# Patient Record
Sex: FEMALE | Race: WHITE | NOT HISPANIC OR LATINO | Employment: FULL TIME | URBAN - METROPOLITAN AREA
[De-identification: names, ages, dates, MRNs, and addresses within clinical notes are randomized per-mention and may not be internally consistent; named-entity substitution may affect disease eponyms.]

---

## 2017-04-04 ENCOUNTER — OFFICE VISIT (OUTPATIENT)
Dept: NEUROSURGERY | Facility: CLINIC | Age: 62
End: 2017-04-04

## 2017-04-04 VITALS
BODY MASS INDEX: 25.78 KG/M2 | TEMPERATURE: 97.7 F | WEIGHT: 151 LBS | SYSTOLIC BLOOD PRESSURE: 138 MMHG | HEIGHT: 64 IN | DIASTOLIC BLOOD PRESSURE: 80 MMHG

## 2017-04-04 DIAGNOSIS — G56.22 ULNAR NEUROPATHY AT ELBOW OF LEFT UPPER EXTREMITY: ICD-10-CM

## 2017-04-04 DIAGNOSIS — M43.10 SPONDYLOLISTHESIS, ACQUIRED: ICD-10-CM

## 2017-04-04 DIAGNOSIS — M47.22 CERVICAL SPONDYLOSIS WITH RADICULOPATHY: Primary | ICD-10-CM

## 2017-04-04 DIAGNOSIS — M48.062 LUMBAR STENOSIS WITH NEUROGENIC CLAUDICATION: ICD-10-CM

## 2017-04-04 PROCEDURE — 99214 OFFICE O/P EST MOD 30 MIN: CPT | Performed by: PHYSICIAN ASSISTANT

## 2017-04-04 RX ORDER — CELECOXIB 200 MG/1
200 CAPSULE ORAL 2 TIMES DAILY
Qty: 60 CAPSULE | Refills: 3 | Status: SHIPPED | OUTPATIENT
Start: 2017-04-04 | End: 2017-06-08

## 2017-04-04 NOTE — PROGRESS NOTES
Patient: Yoli Quinn  : 1955  Chart #: [id number]    Date of Service: 2017    CHIEF COMPLAINT:   1. Neck and left arm pain   2. Low back and lower extremity pain with walking and standing intolerance   3. Left shoulder pain     History of Present Illness Ms. Quinn is a 62 year old  who has been seen in our office a few times for the above symptoms. She has not had surgical invention on her spine.  She has known diffuse cervical spondylosis as well as a stable grade I listhesis of L3 on L4 with generous stenosis at L3-4 and L4-5. Electrodiagnostic studies have demonstrated left ulnar neuropathy at the elbow and a C5-6 radiculopathy.  She has seen Dr. Díaz in the past for a couple cortisone injections in her left shoulder which was beneficial.  We last saw her in August of last year and she was doing reasonably well.  For the past 4 months her neck and back pain has increased. She is also having more left shoulder pain and numbness down her arm. She has pain down both legs. It is worse with bending, prolonged sitting, and long walks. No bowel or bladder dysfunction. She was taking celebrex last year which was helpful but when she ran out she never called for a renewal.       The following portions of the patient's history were reviewed and updated as appropriate: allergies, current medications, past family history, past medical history, past social history, past surgical history and problem list.    Review of Systems   Constitutional: Positive for activity change and unexpected weight change. Negative for appetite change, chills, diaphoresis, fatigue and fever.   HENT: Negative for congestion, dental problem, drooling, ear discharge, ear pain, facial swelling, hearing loss, mouth sores, nosebleeds, postnasal drip, rhinorrhea, sinus pressure, sneezing, sore throat, tinnitus, trouble swallowing and voice change.    Eyes: Negative for photophobia, pain, discharge, redness, itching and  "visual disturbance.   Respiratory: Negative for apnea, cough, choking, chest tightness, shortness of breath, wheezing and stridor.    Cardiovascular: Negative for chest pain, palpitations and leg swelling.   Gastrointestinal: Negative for abdominal distention, abdominal pain, anal bleeding, blood in stool, constipation, diarrhea, nausea, rectal pain and vomiting.   Endocrine: Negative for cold intolerance, heat intolerance, polydipsia, polyphagia and polyuria.   Genitourinary: Positive for frequency and urgency. Negative for decreased urine volume, difficulty urinating, dysuria, enuresis, flank pain, genital sores and hematuria.   Musculoskeletal: Positive for back pain, neck pain and neck stiffness. Negative for arthralgias, gait problem, joint swelling and myalgias.   Skin: Negative for color change, pallor, rash and wound.   Allergic/Immunologic: Negative for environmental allergies, food allergies and immunocompromised state.   Neurological: Positive for numbness and headaches. Negative for dizziness, tremors, seizures, syncope, facial asymmetry, speech difficulty, weakness and light-headedness.   Hematological: Negative for adenopathy. Does not bruise/bleed easily.   Psychiatric/Behavioral: Positive for sleep disturbance. Negative for agitation, behavioral problems, confusion, decreased concentration, dysphoric mood, hallucinations, self-injury and suicidal ideas. The patient is not nervous/anxious and is not hyperactive.        Objective   Vital Signs: Blood pressure 138/80, temperature 97.7 °F (36.5 °C), height 64\" (162.6 cm), weight 151 lb (68.5 kg).  Physical Exam   Constitutional: She appears well-developed and well-nourished. No distress.   HENT:   Head: Normocephalic and atraumatic.   Eyes: EOM are normal. Pupils are equal, round, and reactive to light.   Cardiovascular: Normal rate, regular rhythm and normal heart sounds.    Pulmonary/Chest: Effort normal and breath sounds normal.   Psychiatric: She has " a normal mood and affect. Her behavior is normal. Thought content normal.   Nursing note and vitals reviewed.  Musculoskeletal:  Strength is intact in upper and lower extremities to direct testing.  Muscle is normal throughout.  Station and gait are normal.  Straight leg raising is negative.  Neurologic:  Gait: Able to tandem walk without difficulty.  Coordination is intact.  Finger to nose, heel-to-shin, rapid alternating movements.  Deep tendon reflexes: 2+ and symmetrical.  Sensation is intact to light touch throughout.  Patient is oriented to person, place, and time.  Ritter sign negative    Assessment/Plan    Diagnosis:   1. Cervical spondylosis with left upper extremity radiculopathy  2. L3-4 spondylolisthesis and stenosis.  3. Left ulnar neuropathy at the elbow  4. Left shoulder pain     Medical Decision Making: Ms. Quinn has multiple complaints today.  I would like to start with a few simple things and see if we can narrow things down a bit. I have referred her for some formal physical therapy and I restarted her celebrex. I would also like her to touch base with Dr. Díaz again to address some likely primary shoulder dysfunction. She is very amenable to this. She will follow up in 6 weeks to check her progress, sooner if clinically indicated.               Valeria Voss PA-C  Patient Care Team:  Niranjan Griffin MD as PCP - General  Niranjan Griffin MD as PCP - Family Medicine  Niranjan Griffin MD as Referring Physician (Internal Medicine)

## 2017-06-05 ENCOUNTER — HOSPITAL ENCOUNTER (EMERGENCY)
Facility: HOSPITAL | Age: 62
Discharge: HOME | End: 2017-06-05
Attending: EMERGENCY MEDICINE | Admitting: EMERGENCY MEDICINE

## 2017-06-05 ENCOUNTER — APPOINTMENT (OUTPATIENT)
Dept: GENERAL RADIOLOGY | Facility: HOSPITAL | Age: 62
End: 2017-06-05

## 2017-06-05 VITALS
RESPIRATION RATE: 16 BRPM | OXYGEN SATURATION: 100 % | BODY MASS INDEX: 25.78 KG/M2 | SYSTOLIC BLOOD PRESSURE: 145 MMHG | HEART RATE: 74 BPM | HEIGHT: 64 IN | DIASTOLIC BLOOD PRESSURE: 81 MMHG | TEMPERATURE: 97.5 F | WEIGHT: 151 LBS

## 2017-06-05 DIAGNOSIS — R20.2 PARESTHESIAS IN LEFT HAND: ICD-10-CM

## 2017-06-05 DIAGNOSIS — D64.9 BORDERLINE ANEMIA: ICD-10-CM

## 2017-06-05 DIAGNOSIS — R07.89 CHEST DISCOMFORT: Primary | ICD-10-CM

## 2017-06-05 LAB
ALBUMIN SERPL-MCNC: 4.4 G/DL (ref 3.2–4.8)
ALBUMIN/GLOB SERPL: 1.6 G/DL (ref 1.5–2.5)
ALP SERPL-CCNC: 85 U/L (ref 25–100)
ALT SERPL W P-5'-P-CCNC: 13 U/L (ref 7–40)
ANION GAP SERPL CALCULATED.3IONS-SCNC: 4 MMOL/L (ref 3–11)
AST SERPL-CCNC: 18 U/L (ref 0–33)
BASOPHILS # BLD AUTO: 0.03 10*3/MM3 (ref 0–0.2)
BASOPHILS NFR BLD AUTO: 0.5 % (ref 0–1)
BILIRUB SERPL-MCNC: 0.3 MG/DL (ref 0.3–1.2)
BNP SERPL-MCNC: 11 PG/ML (ref 0–100)
BUN BLD-MCNC: 9 MG/DL (ref 9–23)
BUN/CREAT SERPL: 12.9 (ref 7–25)
CALCIUM SPEC-SCNC: 10 MG/DL (ref 8.7–10.4)
CHLORIDE SERPL-SCNC: 106 MMOL/L (ref 99–109)
CO2 SERPL-SCNC: 27 MMOL/L (ref 20–31)
CREAT BLD-MCNC: 0.7 MG/DL (ref 0.6–1.3)
DEPRECATED RDW RBC AUTO: 46.3 FL (ref 37–54)
EOSINOPHIL # BLD AUTO: 0.15 10*3/MM3 (ref 0.1–0.3)
EOSINOPHIL NFR BLD AUTO: 2.7 % (ref 0–3)
ERYTHROCYTE [DISTWIDTH] IN BLOOD BY AUTOMATED COUNT: 14 % (ref 11.3–14.5)
GFR SERPL CREATININE-BSD FRML MDRD: 85 ML/MIN/1.73
GLOBULIN UR ELPH-MCNC: 2.8 GM/DL
GLUCOSE BLD-MCNC: 87 MG/DL (ref 70–100)
HCT VFR BLD AUTO: 34.4 % (ref 34.5–44)
HGB BLD-MCNC: 10.8 G/DL (ref 11.5–15.5)
HOLD SPECIMEN: NORMAL
HOLD SPECIMEN: NORMAL
IMM GRANULOCYTES # BLD: 0.02 10*3/MM3 (ref 0–0.03)
IMM GRANULOCYTES NFR BLD: 0.4 % (ref 0–0.6)
LIPASE SERPL-CCNC: 41 U/L (ref 6–51)
LYMPHOCYTES # BLD AUTO: 2.07 10*3/MM3 (ref 0.6–4.8)
LYMPHOCYTES NFR BLD AUTO: 37.2 % (ref 24–44)
MCH RBC QN AUTO: 28.3 PG (ref 27–31)
MCHC RBC AUTO-ENTMCNC: 31.4 G/DL (ref 32–36)
MCV RBC AUTO: 90.1 FL (ref 80–99)
MONOCYTES # BLD AUTO: 0.31 10*3/MM3 (ref 0–1)
MONOCYTES NFR BLD AUTO: 5.6 % (ref 0–12)
NEUTROPHILS # BLD AUTO: 2.99 10*3/MM3 (ref 1.5–8.3)
NEUTROPHILS NFR BLD AUTO: 53.6 % (ref 41–71)
PLATELET # BLD AUTO: 309 10*3/MM3 (ref 150–450)
PMV BLD AUTO: 9.7 FL (ref 6–12)
POTASSIUM BLD-SCNC: 3.8 MMOL/L (ref 3.5–5.5)
PROT SERPL-MCNC: 7.2 G/DL (ref 5.7–8.2)
RBC # BLD AUTO: 3.82 10*6/MM3 (ref 3.89–5.14)
SODIUM BLD-SCNC: 137 MMOL/L (ref 132–146)
TROPONIN I SERPL-MCNC: 0 NG/ML (ref 0–0.07)
TROPONIN I SERPL-MCNC: 0 NG/ML (ref 0–0.07)
WBC NRBC COR # BLD: 5.57 10*3/MM3 (ref 3.5–10.8)
WHOLE BLOOD HOLD SPECIMEN: NORMAL
WHOLE BLOOD HOLD SPECIMEN: NORMAL

## 2017-06-05 PROCEDURE — 99284 EMERGENCY DEPT VISIT MOD MDM: CPT

## 2017-06-05 PROCEDURE — 93005 ELECTROCARDIOGRAM TRACING: CPT

## 2017-06-05 PROCEDURE — 93005 ELECTROCARDIOGRAM TRACING: CPT | Performed by: EMERGENCY MEDICINE

## 2017-06-05 PROCEDURE — 83690 ASSAY OF LIPASE: CPT | Performed by: EMERGENCY MEDICINE

## 2017-06-05 PROCEDURE — 80053 COMPREHEN METABOLIC PANEL: CPT | Performed by: EMERGENCY MEDICINE

## 2017-06-05 PROCEDURE — 85025 COMPLETE CBC W/AUTO DIFF WBC: CPT | Performed by: EMERGENCY MEDICINE

## 2017-06-05 PROCEDURE — 84484 ASSAY OF TROPONIN QUANT: CPT

## 2017-06-05 PROCEDURE — 71010 HC CHEST PA OR AP: CPT

## 2017-06-05 PROCEDURE — 83880 ASSAY OF NATRIURETIC PEPTIDE: CPT | Performed by: EMERGENCY MEDICINE

## 2017-06-05 RX ORDER — ASPIRIN 81 MG/1
324 TABLET, CHEWABLE ORAL ONCE
Status: DISCONTINUED | OUTPATIENT
Start: 2017-06-05 | End: 2017-06-05 | Stop reason: HOSPADM

## 2017-06-05 RX ORDER — SODIUM CHLORIDE 0.9 % (FLUSH) 0.9 %
10 SYRINGE (ML) INJECTION AS NEEDED
Status: DISCONTINUED | OUTPATIENT
Start: 2017-06-05 | End: 2017-06-05 | Stop reason: HOSPADM

## 2017-06-05 RX ORDER — ASPIRIN 81 MG/1
243 TABLET, CHEWABLE ORAL ONCE
Status: COMPLETED | OUTPATIENT
Start: 2017-06-05 | End: 2017-06-05

## 2017-06-05 RX ADMIN — ASPIRIN 81 MG 243 MG: 81 TABLET ORAL at 12:16

## 2017-06-05 NOTE — DISCHARGE INSTRUCTIONS
No vigorous physical activity until follow-up with the chest pain center and your cleared      Follow up with one of the [facility] physician groups below to setup primary care. If you have trouble following up, please call Johnna Allan, our transitional care nurse, at [phone].    (Dr. Dacosta, Dr. Bolanos, Dr. Santos, and Dr. Canseco.)  [facility], Primary Care, [phone], [address], [place] [address]    [facility], Primary Care, [phone], [address] [place], [address] [facility], Primary Care, [phone], [address] [place], [address] [facility], Primary Care, [phone], [address] [place], [address]     [place] [address] [facility], Primary Care, [phone], [address] [place], [address]    [place] [address] [facility], Primary Care, [phone], [address]    [place], [address] [facility], Primary Care, [phone], [address] [place], [address] [facility], Primary Care, [phone], [address] [place], [address] [facility], Primary Care, [phone], [address] [place], [address] [facility], Primary Care, [phone], [address] [place], [address] [facility], Primary Care, [phone], [address], [place], [address] [place] [address], Primary Care, [phone], [address] [place], [facility]    Hypertension  Hypertension, commonly called high  blood pressure, is when the force of blood pumping through your arteries is too strong. Your arteries are the blood vessels that carry blood from your heart throughout your body. A blood pressure reading consists of a higher number over a lower number, such as 110/72. The higher number (systolic) is the pressure inside your arteries when your heart pumps. The lower number (diastolic) is the pressure inside your arteries when your heart relaxes. Ideally you want your blood pressure below 120/80.  Hypertension forces your heart to work harder to pump blood. Your arteries may become narrow or stiff. Having untreated or uncontrolled hypertension can cause heart attack, stroke, kidney disease, and other problems.  RISK FACTORS  Some risk factors for high blood pressure are controllable. Others are not.   Risk factors you cannot control include:   · Race. You may be at higher risk if you are .  · Age. Risk increases with age.  · Gender. Men are at higher risk than women before age 45 years. After age 65, women are at higher risk than men.  Risk factors you can control include:  · Not getting enough exercise or physical activity.  · Being overweight.  · Getting too much fat, sugar, calories, or salt in your diet.  · Drinking too much alcohol.  SIGNS AND SYMPTOMS  Hypertension does not usually cause signs or symptoms. Extremely high blood pressure (hypertensive crisis) may cause headache, anxiety, shortness of breath, and nosebleed.  DIAGNOSIS  To check if you have hypertension, your health care provider will measure your blood pressure while you are seated, with your arm held at the level of your heart. It should be measured at least twice using the same arm. Certain conditions can cause a difference in blood pressure between your right and left arms. A blood pressure reading that is higher than normal on one occasion does not mean that you need treatment. If it is not clear whether you have high blood  pressure, you may be asked to return on a different day to have your blood pressure checked again. Or, you may be asked to monitor your blood pressure at home for 1 or more weeks.  TREATMENT  Treating high blood pressure includes making lifestyle changes and possibly taking medicine. Living a healthy lifestyle can help lower high blood pressure. You may need to change some of your habits.  Lifestyle changes may include:  · Following the DASH diet. This diet is high in fruits, vegetables, and whole grains. It is low in salt, red meat, and added sugars.  · Keep your sodium intake below 2,300 mg per day.  · Getting at least 30-45 minutes of aerobic exercise at least 4 times per week.  · Losing weight if necessary.  · Not smoking.  · Limiting alcoholic beverages.  · Learning ways to reduce stress.  Your health care provider may prescribe medicine if lifestyle changes are not enough to get your blood pressure under control, and if one of the following is true:  · You are 18-59 years of age and your systolic blood pressure is above 140.  · You are 60 years of age or older, and your systolic blood pressure is above 150.  · Your diastolic blood pressure is above 90.  · You have diabetes, and your systolic blood pressure is over 140 or your diastolic blood pressure is over 90.  · You have kidney disease and your blood pressure is above 140/90.  · You have heart disease and your blood pressure is above 140/90.  Your personal target blood pressure may vary depending on your medical conditions, your age, and other factors.  HOME CARE INSTRUCTIONS  · Have your blood pressure rechecked as directed by your health care provider.    · Take medicines only as directed by your health care provider. Follow the directions carefully. Blood pressure medicines must be taken as prescribed. The medicine does not work as well when you skip doses. Skipping doses also puts you at risk for problems.  · Do not smoke.    · Monitor your blood  pressure at home as directed by your health care provider.   SEEK MEDICAL CARE IF:   · You think you are having a reaction to medicines taken.  · You have recurrent headaches or feel dizzy.  · You have swelling in your ankles.  · You have trouble with your vision.  SEEK IMMEDIATE MEDICAL CARE IF:  · You develop a severe headache or confusion.  · You have unusual weakness, numbness, or feel faint.  · You have severe chest or abdominal pain.  · You vomit repeatedly.  · You have trouble breathing.  MAKE SURE YOU:   · Understand these instructions.  · Will watch your condition.  · Will get help right away if you are not doing well or get worse.     This information is not intended to replace advice given to you by your health care provider. Make sure you discuss any questions you have with your health care provider.     Document Released: 12/18/2006 Document Revised: 05/03/2016 Document Reviewed: 10/10/2014  [employer] Interactive Patient Education ©2017 [employer]

## 2017-06-05 NOTE — ED PROVIDER NOTES
Subjective   HPI Comments: Mrs. Yoli Quinn is a 62 y.o. female who presents to the ED with c/o chest pressure. She notes of intermittent chest pressure for a couple weeks now. Today her pressure has worsened and has developed some associated SOA, and left arm numbness. She also is dealing cervical spondylosis for the better part of a year now. She reports her symptoms from that is different than her symptoms today. She denies any urianry or stool sx, or any other acute sx at this time. She did have a hx of DM, but that has resolved with weight loss.    Patient is a 62 y.o. female presenting with chest pain.   History provided by:  Patient  Chest Pain   Pain location:  Unable to specify  Pain quality: pressure    Pain radiates to:  Does not radiate  Pain severity:  Mild  Onset quality:  Sudden  Duration: A couple weeks.  Timing:  Intermittent  Progression:  Worsening  Chronicity:  New  Relieved by:  None tried  Worsened by:  Nothing  Ineffective treatments:  None tried  Associated symptoms: numbness (Left arm) and shortness of breath    Associated symptoms: no abdominal pain, no cough, no fever, no nausea and no vomiting    Risk factors: no diabetes mellitus and no hypertension        Review of Systems   Constitutional: Negative for chills and fever.   HENT: Negative for congestion, rhinorrhea and sore throat.    Respiratory: Positive for shortness of breath. Negative for cough.    Cardiovascular: Positive for chest pain.   Gastrointestinal: Negative for abdominal pain, blood in stool, diarrhea, nausea and vomiting.   Genitourinary: Negative for difficulty urinating, dysuria and hematuria.   Neurological: Positive for numbness (Left arm).   All other systems reviewed and are negative.      Past Medical History:   Diagnosis Date   • Diabetes mellitus    • Hypertension    • Low back pain    1:40 PM  Sent from [facility] for chest pain. Neck and left arm pain is somewhat chronic. HPI from Dr. Voss's note on  "4/4/2017:  \"History of Present Illness Ms. Quinn is a 62 year old  who has been seen in our office a few times for the above symptoms. She has not had surgical invention on her spine. She has known diffuse cervical spondylosis as well as a stable grade I listhesis of L3 on L4 with generous stenosis at L3-4 and L4-5. Electrodiagnostic studies have demonstrated left ulnar neuropathy at the elbow and a C5-6 radiculopathy. She has seen Dr. Díaz in the past for a couple cortisone injections in her left shoulder which was beneficial. We last saw her in August of last year and she was doing reasonably well. For the past 4 months her neck and back pain has increased. She is also having more left shoulder pain and numbness down her arm. She has pain down both legs. It is worse with bending, prolonged sitting, and long walks. No bowel or bladder dysfunction. She was taking celebrex last year which was helpful but when she ran out she never called for a renewal.\" -EIR    Allergies   Allergen Reactions   • Codeine Sulfate    • Lidocaine        Past Surgical History:   Procedure Laterality Date   • TONSILLECTOMY     • TUBAL ABDOMINAL LIGATION         Family History   Problem Relation Age of Onset   • Cancer Mother    • Heart disease Father        Social History     Social History   • Marital status:      Spouse name: N/A   • Number of children: N/A   • Years of education: N/A     Social History Main Topics   • Smoking status: Never Smoker   • Smokeless tobacco: Never Used   • Alcohol use No   • Drug use: No   • Sexual activity: Defer     Other Topics Concern   • None     Social History Narrative   • None         Objective   Physical Exam   Constitutional: She is oriented to person, place, and time. She appears well-developed and well-nourished. No distress.   Problems with he right thumb and hand but is chronic.   HENT:   Head: Normocephalic and atraumatic.   Mouth/Throat: Oropharynx is clear and moist. "   Eyes: Conjunctivae are normal.   Neck: Normal range of motion. Neck supple.   Cardiovascular: Normal rate, regular rhythm, normal heart sounds and intact distal pulses.    Pulmonary/Chest: Effort normal and breath sounds normal. No respiratory distress.   Abdominal: Soft. There is no tenderness.   Musculoskeletal: Normal range of motion.   Neurological: She is alert and oriented to person, place, and time. She has normal strength.   Skin: Skin is warm and dry.   Psychiatric: She has a normal mood and affect. Her behavior is normal.   Nursing note and vitals reviewed.      Procedures         ED Course  ED Course     Recent Results (from the past 24 hour(s))   Comprehensive Metabolic Panel    Collection Time: 06/05/17 11:48 AM   Result Value Ref [place]    Glucose 87 70 - 100 mg/dL    BUN 9 9 - 23 mg/dL    Creatinine 0.70 0.60 - 1.30 mg/dL    Sodium 137 132 - 146 mmol/L    Potassium 3.8 3.5 - 5.5 mmol/L    Chloride 106 99 - 109 mmol/L    CO2 27.0 20.0 - 31.0 mmol/L    Calcium 10.0 8.7 - 10.4 mg/dL    Total Protein 7.2 5.7 - 8.2 g/dL    Albumin 4.40 3.20 - 4.80 g/dL    ALT (SGPT) 13 7 - 40 U/L    AST (SGOT) 18 0 - 33 U/L    Alkaline Phosphatase 85 25 - 100 U/L    Total Bilirubin 0.3 0.3 - 1.2 mg/dL    eGFR Non African Amer 85 >60 mL/min/1.73    Globulin 2.8 gm/dL    A/G Ratio 1.6 1.5 - 2.5 g/dL    BUN/Creatinine Ratio 12.9 7.0 - 25.0    Anion Gap 4.0 3.0 - 11.0 mmol/L   Lipase    Collection Time: 06/05/17 11:48 AM   Result Value Ref [place]    Lipase 41 6 - 51 U/L   BNP    Collection Time: 06/05/17 11:48 AM   Result Value Ref [place]    BNP 11.0 0.0 - 100.0 pg/mL   Light Blue Top    Collection Time: 06/05/17 11:48 AM   Result Value Ref [place]    Extra Tube hold for add-on    Green Top (Gel)    Collection Time: 06/05/17 11:48 AM   Result Value Ref [place]    Extra Tube Hold for add-ons.    Lavender Top    Collection Time: 06/05/17 11:48 AM   Result Value Ref [place]    Extra Tube hold for add-on    Gold Top - SST    Collection  Time: 06/05/17 11:48 AM   Result Value Ref [place]    Extra Tube Hold for add-ons.    CBC Auto Differential    Collection Time: 06/05/17 11:48 AM   Result Value Ref [place]    WBC 5.57 3.50 - 10.80 10*3/mm3    RBC 3.82 (L) 3.89 - 5.14 10*6/mm3    Hemoglobin 10.8 (L) 11.5 - 15.5 g/dL    Hematocrit 34.4 (L) 34.5 - 44.0 %    MCV 90.1 80.0 - 99.0 fL    MCH 28.3 27.0 - 31.0 pg    MCHC 31.4 (L) 32.0 - 36.0 g/dL    RDW 14.0 11.3 - 14.5 %    RDW-SD 46.3 37.0 - 54.0 fl    MPV 9.7 6.0 - 12.0 fL    Platelets 309 150 - 450 10*3/mm3    Neutrophil % 53.6 41.0 - 71.0 %    Lymphocyte % 37.2 24.0 - 44.0 %    Monocyte % 5.6 0.0 - 12.0 %    Eosinophil % 2.7 0.0 - 3.0 %    Basophil % 0.5 0.0 - 1.0 %    Immature Grans % 0.4 0.0 - 0.6 %    Neutrophils, Absolute 2.99 1.50 - 8.30 10*3/mm3    Lymphocytes, Absolute 2.07 0.60 - 4.80 10*3/mm3    Monocytes, Absolute 0.31 0.00 - 1.00 10*3/mm3    Eosinophils, Absolute 0.15 0.10 - 0.30 10*3/mm3    Basophils, Absolute 0.03 0.00 - 0.20 10*3/mm3    Immature Grans, Absolute 0.02 0.00 - 0.03 10*3/mm3   POC Troponin, Rapid    Collection Time: 06/05/17 11:50 AM   Result Value Ref [place]    Troponin I 0.00 0.00 - 0.07 ng/mL   POC Troponin, Rapid    Collection Time: 06/05/17  2:02 PM   Result Value Ref [place]    Troponin I 0.00 0.00 - 0.07 ng/mL     Note: In addition to lab results from this visit, the labs listed above may include labs taken at another facility or during a different encounter within the last 24 hours. Please correlate lab times with ED admission and discharge times for further clarification of the services performed during this visit.    XR Chest 1 View   Preliminary Result   No acute cardiopulmonary disease.       D:  06/05/2017   E:  06/05/2017            Vitals:    06/05/17 1415 06/05/17 1430 06/05/17 1431 06/05/17 1445   BP: 140/84 137/83  145/81   BP Location:       Patient Position:       Pulse:       Resp:       Temp:       TempSrc:       SpO2:   99% 100%   Weight:       Height:          Medications   aspirin chewable tablet 243 mg (243 mg Oral Given 6/5/17 1216)     ECG/EMG Results (last 24 hours)     Procedure Component Value Units Date/Time    ECG 12 Lead [[id number]] Collected:  06/05/17 1142     Updated:  06/05/17 1343    Narrative:       Test Reason : chest pain  Blood Pressure : **/** mmHG  Vent. Rate : 073 BPM     Atrial Rate : 073 BPM     P-R Int : 148 ms          QRS Dur : 080 ms      QT Int : 390 ms       P-R-T Axes : 074 060 082 degrees     QTc Int : 429 ms    Normal sinus rhythm  When compared with ECG of 16-JUL-2013 10:02,  Electronic demand pacing is no longer present  premature ventricular complexes are no longer present  i cant see that the patient ever had a pacemaker  Confirmed by PAOLA AYALA MD ([id number]) on 6/5/2017 1:43:05 PM    Referred By:  EDMD           Confirmed By:PAOLA AYALA MD                HEART Score  History: Slightly suspicious (+0)  ECG: Normal (+0)  Age: 45 through 65 (+1)  Risk Factors: 1 - 2 risk factors (+1)  Troponin: Normal limit or lower (+0)  Total: 2             MDM  Number of Diagnoses or Management Options  Borderline anemia:   Chest discomfort:   Paresthesias in left hand:   Diagnosis management comments:       I reviewed all available studies at the bedside with the patient.  They are reassuring.  Her cardiac markers and EKGs are unrevealing.  She has had chest discomfort and dyspnea for weeks.    I think she may have 2 separate issues. Some the paresthesias in her left hand and arm have been chronic and she has seen Dr. Duncan for these. I'll refer her back to him for further evaluation.    I will refer her to the chest pain clinic for follow-up of the chest discomfort as she may need further risk stratification for coronary artery disease.    She does have borderline anemia and she will follow-up with her primary care doctor in [place]. She actually requested a list of primary care doctors here in [place] and I'll give her this to follow-up  with regarding this visit.    No vigorous physical activity until cleared by chest pain clinic.    All are agreeable with the plan       Amount and/or Complexity of Data Reviewed  Clinical lab tests: reviewed  Tests in the radiology section of CPT®: reviewed  Tests in the medicine section of CPT®: reviewed        Final diagnoses:   Chest discomfort   Paresthesias in left hand   Borderline anemia   EMR Dragon/Transcription disclaimer:  Much of this encounter note is an electronic transcription/translation of spoken language to printed text. The electronic translation of spoken language may permit erroneous, or at times, nonsensical words or phrases to be inadvertently transcribed; Although I have reviewed the note for such errors, some may still exist.      Documentation assistance provided by leonardo FIELDS.  Information recorded by the leonardo was done at my direction and has been verified and validated by me.     Yvan Fields  06/05/17 1355       Yvan Fields  06/05/17 1414       Yvan Fields  06/05/17 1511       Ameya Olmos MD  06/06/17 0973

## 2017-06-08 ENCOUNTER — OFFICE VISIT (OUTPATIENT)
Dept: CARDIOLOGY | Facility: HOSPITAL | Age: 62
End: 2017-06-08

## 2017-06-08 ENCOUNTER — HOSPITAL ENCOUNTER (OUTPATIENT)
Dept: CARDIOLOGY | Facility: HOSPITAL | Age: 62
Discharge: HOME | End: 2017-06-08
Admitting: NURSE PRACTITIONER

## 2017-06-08 VITALS
DIASTOLIC BLOOD PRESSURE: 56 MMHG | TEMPERATURE: 97 F | HEART RATE: 74 BPM | RESPIRATION RATE: 22 BRPM | HEIGHT: 64 IN | OXYGEN SATURATION: 100 % | WEIGHT: 152 LBS | SYSTOLIC BLOOD PRESSURE: 105 MMHG | BODY MASS INDEX: 25.95 KG/M2

## 2017-06-08 VITALS
DIASTOLIC BLOOD PRESSURE: 80 MMHG | BODY MASS INDEX: 25.95 KG/M2 | HEART RATE: 66 BPM | WEIGHT: 152 LBS | HEIGHT: 64 IN | SYSTOLIC BLOOD PRESSURE: 138 MMHG

## 2017-06-08 DIAGNOSIS — R07.89 ATYPICAL CHEST PAIN: ICD-10-CM

## 2017-06-08 DIAGNOSIS — R07.89 ATYPICAL CHEST PAIN: Primary | ICD-10-CM

## 2017-06-08 DIAGNOSIS — R20.0 NUMBNESS AND TINGLING IN LEFT ARM: ICD-10-CM

## 2017-06-08 DIAGNOSIS — R20.2 NUMBNESS AND TINGLING IN LEFT ARM: ICD-10-CM

## 2017-06-08 DIAGNOSIS — R53.83 OTHER FATIGUE: ICD-10-CM

## 2017-06-08 LAB
BH CV STRESS BP STAGE 1: NORMAL
BH CV STRESS DURATION MIN STAGE 1: 3
BH CV STRESS DURATION MIN STAGE 2: 3
BH CV STRESS DURATION MIN STAGE 3: 3
BH CV STRESS DURATION MIN STAGE 4: 0
BH CV STRESS DURATION SEC STAGE 1: 0
BH CV STRESS DURATION SEC STAGE 2: 0
BH CV STRESS DURATION SEC STAGE 3: 0
BH CV STRESS DURATION SEC STAGE 4: 10
BH CV STRESS GRADE STAGE 1: 10
BH CV STRESS GRADE STAGE 2: 12
BH CV STRESS GRADE STAGE 3: 14
BH CV STRESS GRADE STAGE 4: 16
BH CV STRESS HR STAGE 1: 111
BH CV STRESS HR STAGE 2: 131
BH CV STRESS HR STAGE 3: 137
BH CV STRESS HR STAGE 4: 139
BH CV STRESS METS STAGE 1: 5
BH CV STRESS METS STAGE 2: 7.5
BH CV STRESS METS STAGE 3: 10
BH CV STRESS METS STAGE 4: 13.5
BH CV STRESS O2 STAGE 1: 100
BH CV STRESS O2 STAGE 2: 100
BH CV STRESS O2 STAGE 4: 98
BH CV STRESS PROTOCOL 1: NORMAL
BH CV STRESS RECOVERY BP: NORMAL MMHG
BH CV STRESS RECOVERY HR: 86 BPM
BH CV STRESS RECOVERY O2: 96 %
BH CV STRESS SPEED STAGE 1: 1.7
BH CV STRESS SPEED STAGE 2: 2.5
BH CV STRESS SPEED STAGE 3: 3.4
BH CV STRESS SPEED STAGE 4: 4.2
BH CV STRESS STAGE 1: 1
BH CV STRESS STAGE 2: 2
BH CV STRESS STAGE 3: 3
BH CV STRESS STAGE 4: 4
MAXIMAL PREDICTED HEART RATE: 160 BPM
PERCENT MAX PREDICTED HR: 88.13 %
STRESS BASELINE BP: NORMAL MMHG
STRESS BASELINE HR: 65 BPM
STRESS O2 SAT REST: 100 %
STRESS PERCENT HR: 104 %
STRESS POST ESTIMATED WORKLOAD: 10.3 METS
STRESS POST EXERCISE DUR MIN: 9 MIN
STRESS POST EXERCISE DUR SEC: 10 SEC
STRESS POST O2 SAT PEAK: 98 %
STRESS POST PEAK BP: NORMAL MMHG
STRESS POST PEAK HR: 141 BPM
STRESS TARGET HR: 136 BPM

## 2017-06-08 PROCEDURE — 99214 OFFICE O/P EST MOD 30 MIN: CPT | Performed by: NURSE PRACTITIONER

## 2017-06-08 PROCEDURE — 93018 CV STRESS TEST I&R ONLY: CPT | Performed by: INTERNAL MEDICINE

## 2017-06-08 PROCEDURE — 93017 CV STRESS TEST TRACING ONLY: CPT

## 2017-06-08 RX ORDER — DIPHENHYDRAMINE HCL 25 MG
50 CAPSULE ORAL NIGHTLY
COMMUNITY
End: 2022-01-03

## 2017-06-08 RX ORDER — ASPIRIN 325 MG
325 TABLET ORAL DAILY
COMMUNITY

## 2017-06-08 RX ORDER — FEXOFENADINE HCL AND PSEUDOEPHEDRINE HCI 180; 240 MG/1; MG/1
1 TABLET, EXTENDED RELEASE ORAL DAILY
COMMUNITY
End: 2022-01-03

## 2017-06-08 NOTE — PROGRESS NOTES
[facility]  [facility]  Chest Pain Clinic    Encounter Date:06/08/2017     Yoli Quinn  [address] [place] [id number]  [phone]    1955    Niranjan Griffin MD    Yoli Quinn is a 62 y.o. female.      Subjective:     Chief Complaint:  Establish Care (s/p ED Visit for Chest Pain)       HPI presents to the chest pain clinic for ongoing evaluation of her chest pain.  Patient presented to [facility] ED on 6/5/2017 with complaints of chest pressure centralized chest that does not radiate.  She notes it has been present for the last few weeks and she reports she's been under significant stress due to her 's recent stroke.  Chest Pain   Pain location: centralized  Pain quality: pressure   Pain radiates to: Does not radiate  Pain severity: Mild  Onset quality: Sudden  Duration: A couple weeks.  Timing: Intermittent  Progression: Worsening  Chronicity: New  Relieved by: None tried  Worsened by: Nothing  Ineffective treatments: None tried  Associated symptoms: numbness (Left arm) and shortness of breath   Associated symptoms: no abdominal pain, no cough, no fever, no nausea and no vomiting         Cardiac risk factors:   age (>50)      Allergies   Allergen Reactions   • Codeine Sulfate Nausea And Vomiting   • Lidocaine Swelling     Dental procedures         Current Outpatient Prescriptions:   •  aspirin 325 MG tablet, Take 325 mg by mouth Daily., Disp: , Rfl:   •  diphenhydrAMINE (BENADRYL) 25 mg capsule, Take 50 mg by mouth Every Night., Disp: , Rfl:   •  fexofenadine-pseudoephedrine (ALLEGRA-D 24) 180-240 MG per 24 hr tablet, Take 1 tablet by mouth Daily., Disp: , Rfl:   •  fluticasone (FLONASE) 50 MCG/ACT nasal spray, 2 sprays into each nostril Daily As Needed., Disp: , Rfl:   •  omeprazole (PriLOSEC) 20 MG capsule, Take 20 mg by mouth Daily., Disp: , Rfl:   •  topiramate (TOPAMAX) 50 MG tablet, Take 100 mg by mouth Every Night., Disp: , Rfl:     The  following portions of the patient's history were reviewed and updated as appropriate in Epic:  Problem list, allergies, current medications, past medical and surgical history, past social and family history.     Review of Systems   Constitution: Positive for malaise/fatigue. Negative for chills, decreased appetite, diaphoresis, fever, weakness, night sweats, weight gain and weight loss.   HENT: Positive for headaches. Negative for congestion, hearing loss, hoarse voice and nosebleeds.         Postnasal drip   Eyes: Negative for blurred vision, visual disturbance and visual halos.   Cardiovascular: Positive for chest pain and dyspnea on exertion (occurs with chest pain). Negative for claudication, cyanosis, irregular heartbeat, leg swelling, near-syncope, orthopnea, palpitations, paroxysmal nocturnal dyspnea and syncope.   Respiratory: Positive for shortness of breath. Negative for cough, hemoptysis, sleep disturbances due to breathing, snoring, sputum production and wheezing.    Hematologic/Lymphatic: Negative for bleeding problem. Does not bruise/bleed easily.   Skin: Negative for dry skin, itching and rash.   Musculoskeletal: Negative for arthritis, joint pain, joint swelling and myalgias.   Gastrointestinal: Positive for bloating. Negative for abdominal pain, constipation, diarrhea, flatus, heartburn, hematemesis, hematochezia, melena, nausea and vomiting.   Genitourinary: Negative for dysuria, frequency, hematuria, nocturia and urgency.   Neurological: Positive for light-headedness (associated with chest pain). Negative for excessive daytime sleepiness, dizziness and loss of balance.   Psychiatric/Behavioral: Negative for depression. The patient does not have insomnia and is not nervous/anxious.    Allergic/Immunologic:        Seasonal allergies.       Objective:     Vitals:    06/08/17 0820 06/08/17 0821 06/08/17 0822   BP: 113/60 116/61 105/56   BP Location: Left arm Right arm Left arm   Patient Position:  "Sitting Sitting Standing   Cuff Size: Adult     Pulse: 70  74   Resp: 22     Temp: 97 °F (36.1 °C)     TempSrc: Temporal Artery      SpO2: 100%     Weight: 152 lb (68.9 kg)     Height: 64\" (162.6 cm)           Physical Exam   Constitutional: She is oriented to person, place, and time. She appears well-developed and well-nourished. She is active and cooperative. No distress.   HENT:   Head: Normocephalic and atraumatic.   Mouth/Throat: Oropharynx is clear and moist.   Eyes: Conjunctivae and EOM are normal. Pupils are equal, round, and reactive to light.   Neck: Normal range of motion. Neck supple. No JVD present. No tracheal deviation present. No thyromegaly present.   Cardiovascular: Normal rate, regular rhythm, normal heart sounds and intact distal pulses.    Pulmonary/Chest: Effort normal and breath sounds normal.   Abdominal: Soft. Bowel sounds are normal. She exhibits no distension. There is no tenderness.   Musculoskeletal: Normal range of motion.   Neurological: She is alert and oriented to person, place, and time.   Skin: Skin is warm, dry and intact.   Psychiatric: She has a normal mood and affect. Her behavior is normal.   Nursing note and vitals reviewed.      Lab and Diagnostic Review:  Results for orders placed or performed during the hospital encounter of 06/05/17   Comprehensive Metabolic Panel   Result Value Ref Range    Glucose 87 70 - 100 mg/dL    BUN 9 9 - 23 mg/dL    Creatinine 0.70 0.60 - 1.30 mg/dL    Sodium 137 132 - 146 mmol/L    Potassium 3.8 3.5 - 5.5 mmol/L    Chloride 106 99 - 109 mmol/L    CO2 27.0 20.0 - 31.0 mmol/L    Calcium 10.0 8.7 - 10.4 mg/dL    Total Protein 7.2 5.7 - 8.2 g/dL    Albumin 4.40 3.20 - 4.80 g/dL    ALT (SGPT) 13 7 - 40 U/L    AST (SGOT) 18 0 - 33 U/L    Alkaline Phosphatase 85 25 - 100 U/L    Total Bilirubin 0.3 0.3 - 1.2 mg/dL    eGFR Non African Amer 85 >60 mL/min/1.73    Globulin 2.8 gm/dL    A/G Ratio 1.6 1.5 - 2.5 g/dL    BUN/Creatinine Ratio 12.9 7.0 - 25.0    " Anion Gap 4.0 3.0 - 11.0 mmol/L   Lipase   Result Value Ref Range    Lipase 41 6 - 51 U/L   BNP   Result Value Ref Range    BNP 11.0 0.0 - 100.0 pg/mL   CBC Auto Differential   Result Value Ref Range    WBC 5.57 3.50 - 10.80 10*3/mm3    RBC 3.82 (L) 3.89 - 5.14 10*6/mm3    Hemoglobin 10.8 (L) 11.5 - 15.5 g/dL    Hematocrit 34.4 (L) 34.5 - 44.0 %    MCV 90.1 80.0 - 99.0 fL    MCH 28.3 27.0 - 31.0 pg    MCHC 31.4 (L) 32.0 - 36.0 g/dL    RDW 14.0 11.3 - 14.5 %    RDW-SD 46.3 37.0 - 54.0 fl    MPV 9.7 6.0 - 12.0 fL    Platelets 309 150 - 450 10*3/mm3    Neutrophil % 53.6 41.0 - 71.0 %    Lymphocyte % 37.2 24.0 - 44.0 %    Monocyte % 5.6 0.0 - 12.0 %    Eosinophil % 2.7 0.0 - 3.0 %    Basophil % 0.5 0.0 - 1.0 %    Immature Grans % 0.4 0.0 - 0.6 %    Neutrophils, Absolute 2.99 1.50 - 8.30 10*3/mm3    Lymphocytes, Absolute 2.07 0.60 - 4.80 10*3/mm3    Monocytes, Absolute 0.31 0.00 - 1.00 10*3/mm3    Eosinophils, Absolute 0.15 0.10 - 0.30 10*3/mm3    Basophils, Absolute 0.03 0.00 - 0.20 10*3/mm3    Immature Grans, Absolute 0.02 0.00 - 0.03 10*3/mm3   POC Troponin, Rapid   Result Value Ref Range    Troponin I 0.00 0.00 - 0.07 ng/mL   POC Troponin, Rapid   Result Value Ref Range    Troponin I 0.00 0.00 - 0.07 ng/mL   EKG 6/5/2017: Normal sinus rhythm at 73 bpm  EKG #2 6/5/2017: Normal sinus rhythm at 70 bpm  Chest x-ray no acute cardiopulmonary disease    Assessment and Plan:     1. Atypical chest pain    - Treadmill Stress Test; Future    2. Other fatigue  GXT today    3. Numbness and tingling in left arm   Resolved  GXT today    Further treatment plan pending results of GXT    It has been a pleasure to participate in the care of this patient.  Patient was instructed to call the [facility] with any questions, concerns, or worsening symptoms.      *Please note that portions of this note were completed with a voice recognition program. Efforts were made to edit the dictations, but occasionally words are  mistranscribed.

## 2017-08-21 ENCOUNTER — HOSPITAL ENCOUNTER (EMERGENCY)
Facility: HOSPITAL | Age: 62
Discharge: ANOTHER HOSPITAL | End: 2017-08-21
Attending: EMERGENCY MEDICINE | Admitting: EMERGENCY MEDICINE

## 2017-08-21 ENCOUNTER — APPOINTMENT (OUTPATIENT)
Dept: CT IMAGING | Facility: HOSPITAL | Age: 62
End: 2017-08-21

## 2017-08-21 VITALS
BODY MASS INDEX: 23.05 KG/M2 | OXYGEN SATURATION: 99 % | DIASTOLIC BLOOD PRESSURE: 73 MMHG | HEART RATE: 72 BPM | RESPIRATION RATE: 18 BRPM | WEIGHT: 135 LBS | HEIGHT: 64 IN | SYSTOLIC BLOOD PRESSURE: 120 MMHG | TEMPERATURE: 98.2 F

## 2017-08-21 DIAGNOSIS — K25.1: Primary | ICD-10-CM

## 2017-08-21 DIAGNOSIS — R10.13 EPIGASTRIC ABDOMINAL PAIN: ICD-10-CM

## 2017-08-21 LAB
ALBUMIN SERPL-MCNC: 4.1 G/DL (ref 3.2–4.8)
ALBUMIN/GLOB SERPL: 1.6 G/DL (ref 1.5–2.5)
ALP SERPL-CCNC: 84 U/L (ref 25–100)
ALT SERPL W P-5'-P-CCNC: 20 U/L (ref 7–40)
ANION GAP SERPL CALCULATED.3IONS-SCNC: 11 MMOL/L (ref 3–11)
AST SERPL-CCNC: 25 U/L (ref 0–33)
BACTERIA UR QL AUTO: ABNORMAL /HPF
BASOPHILS # BLD AUTO: 0.02 10*3/MM3 (ref 0–0.2)
BASOPHILS NFR BLD AUTO: 0.2 % (ref 0–1)
BILIRUB SERPL-MCNC: 0.4 MG/DL (ref 0.3–1.2)
BILIRUB UR QL STRIP: NEGATIVE
BUN BLD-MCNC: 13 MG/DL (ref 9–23)
BUN/CREAT SERPL: 21.7 (ref 7–25)
CALCIUM SPEC-SCNC: 9.2 MG/DL (ref 8.7–10.4)
CHLORIDE SERPL-SCNC: 99 MMOL/L (ref 99–109)
CLARITY UR: ABNORMAL
CO2 SERPL-SCNC: 23 MMOL/L (ref 20–31)
COLOR UR: YELLOW
CREAT BLD-MCNC: 0.6 MG/DL (ref 0.6–1.3)
D-LACTATE SERPL-SCNC: 0.5 MMOL/L (ref 0.5–2)
DEPRECATED RDW RBC AUTO: 45.5 FL (ref 37–54)
EOSINOPHIL # BLD AUTO: 0.03 10*3/MM3 (ref 0–0.3)
EOSINOPHIL NFR BLD AUTO: 0.3 % (ref 0–3)
ERYTHROCYTE [DISTWIDTH] IN BLOOD BY AUTOMATED COUNT: 13.9 % (ref 11.3–14.5)
GFR SERPL CREATININE-BSD FRML MDRD: 102 ML/MIN/1.73
GLOBULIN UR ELPH-MCNC: 2.6 GM/DL
GLUCOSE BLD-MCNC: 108 MG/DL (ref 70–100)
GLUCOSE UR STRIP-MCNC: NEGATIVE MG/DL
HCT VFR BLD AUTO: 33.4 % (ref 34.5–44)
HGB BLD-MCNC: 11 G/DL (ref 11.5–15.5)
HGB UR QL STRIP.AUTO: ABNORMAL
HOLD SPECIMEN: NORMAL
HOLD SPECIMEN: NORMAL
HYALINE CASTS UR QL AUTO: ABNORMAL /LPF
IMM GRANULOCYTES # BLD: 0.03 10*3/MM3 (ref 0–0.03)
IMM GRANULOCYTES NFR BLD: 0.3 % (ref 0–0.6)
KETONES UR QL STRIP: ABNORMAL
LEUKOCYTE ESTERASE UR QL STRIP.AUTO: NEGATIVE
LIPASE SERPL-CCNC: 41 U/L (ref 6–51)
LYMPHOCYTES # BLD AUTO: 1.02 10*3/MM3 (ref 0.6–4.8)
LYMPHOCYTES NFR BLD AUTO: 9 % (ref 24–44)
MCH RBC QN AUTO: 29.3 PG (ref 27–31)
MCHC RBC AUTO-ENTMCNC: 32.9 G/DL (ref 32–36)
MCV RBC AUTO: 89.1 FL (ref 80–99)
MONOCYTES # BLD AUTO: 0.4 10*3/MM3 (ref 0–1)
MONOCYTES NFR BLD AUTO: 3.5 % (ref 0–12)
NEUTROPHILS # BLD AUTO: 9.89 10*3/MM3 (ref 1.5–8.3)
NEUTROPHILS NFR BLD AUTO: 86.7 % (ref 41–71)
NITRITE UR QL STRIP: POSITIVE
PH UR STRIP.AUTO: 5.5 [PH] (ref 5–8)
PLATELET # BLD AUTO: 269 10*3/MM3 (ref 150–450)
PMV BLD AUTO: 10.3 FL (ref 6–12)
POTASSIUM BLD-SCNC: 3.5 MMOL/L (ref 3.5–5.5)
PROT SERPL-MCNC: 6.7 G/DL (ref 5.7–8.2)
PROT UR QL STRIP: NEGATIVE
RBC # BLD AUTO: 3.75 10*6/MM3 (ref 3.89–5.14)
RBC # UR: ABNORMAL /HPF
REF LAB TEST METHOD: ABNORMAL
SODIUM BLD-SCNC: 133 MMOL/L (ref 132–146)
SP GR UR STRIP: 1.02 (ref 1–1.03)
SQUAMOUS #/AREA URNS HPF: ABNORMAL /HPF
UROBILINOGEN UR QL STRIP: ABNORMAL
WBC NRBC COR # BLD: 11.39 10*3/MM3 (ref 3.5–10.8)
WBC UR QL AUTO: ABNORMAL /HPF
WHOLE BLOOD HOLD SPECIMEN: NORMAL
WHOLE BLOOD HOLD SPECIMEN: NORMAL

## 2017-08-21 PROCEDURE — 85025 COMPLETE CBC W/AUTO DIFF WBC: CPT | Performed by: EMERGENCY MEDICINE

## 2017-08-21 PROCEDURE — 25010000002 PIPERACILLIN SOD-TAZOBACTAM PER 1 G: Performed by: EMERGENCY MEDICINE

## 2017-08-21 PROCEDURE — 80053 COMPREHEN METABOLIC PANEL: CPT | Performed by: EMERGENCY MEDICINE

## 2017-08-21 PROCEDURE — 36415 COLL VENOUS BLD VENIPUNCTURE: CPT

## 2017-08-21 PROCEDURE — 96372 THER/PROPH/DIAG INJ SC/IM: CPT

## 2017-08-21 PROCEDURE — 99284 EMERGENCY DEPT VISIT MOD MDM: CPT

## 2017-08-21 PROCEDURE — 87186 SC STD MICRODIL/AGAR DIL: CPT | Performed by: EMERGENCY MEDICINE

## 2017-08-21 PROCEDURE — 83605 ASSAY OF LACTIC ACID: CPT | Performed by: EMERGENCY MEDICINE

## 2017-08-21 PROCEDURE — 87077 CULTURE AEROBIC IDENTIFY: CPT | Performed by: EMERGENCY MEDICINE

## 2017-08-21 PROCEDURE — 96375 TX/PRO/DX INJ NEW DRUG ADDON: CPT

## 2017-08-21 PROCEDURE — 81001 URINALYSIS AUTO W/SCOPE: CPT | Performed by: EMERGENCY MEDICINE

## 2017-08-21 PROCEDURE — 0 IOPAMIDOL 61 % SOLUTION: Performed by: EMERGENCY MEDICINE

## 2017-08-21 PROCEDURE — 74177 CT ABD & PELVIS W/CONTRAST: CPT

## 2017-08-21 PROCEDURE — 25010000002 ONDANSETRON PER 1 MG: Performed by: EMERGENCY MEDICINE

## 2017-08-21 PROCEDURE — 87086 URINE CULTURE/COLONY COUNT: CPT | Performed by: EMERGENCY MEDICINE

## 2017-08-21 PROCEDURE — 25010000002 KETOROLAC TROMETHAMINE PER 15 MG: Performed by: EMERGENCY MEDICINE

## 2017-08-21 PROCEDURE — 25010000002 DICYCLOMINE PER 20 MG: Performed by: EMERGENCY MEDICINE

## 2017-08-21 PROCEDURE — 93005 ELECTROCARDIOGRAM TRACING: CPT | Performed by: EMERGENCY MEDICINE

## 2017-08-21 PROCEDURE — 83690 ASSAY OF LIPASE: CPT | Performed by: EMERGENCY MEDICINE

## 2017-08-21 PROCEDURE — 96365 THER/PROPH/DIAG IV INF INIT: CPT

## 2017-08-21 PROCEDURE — 25010000002 MORPHINE PER 10 MG: Performed by: EMERGENCY MEDICINE

## 2017-08-21 PROCEDURE — 96361 HYDRATE IV INFUSION ADD-ON: CPT

## 2017-08-21 RX ORDER — MORPHINE SULFATE 4 MG/ML
4 INJECTION, SOLUTION INTRAMUSCULAR; INTRAVENOUS ONCE
Status: COMPLETED | OUTPATIENT
Start: 2017-08-21 | End: 2017-08-21

## 2017-08-21 RX ORDER — FAMOTIDINE 10 MG/ML
20 INJECTION, SOLUTION INTRAVENOUS ONCE
Status: COMPLETED | OUTPATIENT
Start: 2017-08-21 | End: 2017-08-21

## 2017-08-21 RX ORDER — PANTOPRAZOLE SODIUM 40 MG/10ML
80 INJECTION, POWDER, LYOPHILIZED, FOR SOLUTION INTRAVENOUS ONCE
Status: COMPLETED | OUTPATIENT
Start: 2017-08-21 | End: 2017-08-21

## 2017-08-21 RX ORDER — SODIUM CHLORIDE 0.9 % (FLUSH) 0.9 %
10 SYRINGE (ML) INJECTION AS NEEDED
Status: DISCONTINUED | OUTPATIENT
Start: 2017-08-21 | End: 2017-08-21 | Stop reason: HOSPADM

## 2017-08-21 RX ORDER — SODIUM CHLORIDE 9 MG/ML
1000 INJECTION, SOLUTION INTRAVENOUS ONCE
Status: COMPLETED | OUTPATIENT
Start: 2017-08-21 | End: 2017-08-21

## 2017-08-21 RX ORDER — KETOROLAC TROMETHAMINE 15 MG/ML
7.5 INJECTION, SOLUTION INTRAMUSCULAR; INTRAVENOUS ONCE
Status: COMPLETED | OUTPATIENT
Start: 2017-08-21 | End: 2017-08-21

## 2017-08-21 RX ORDER — ESCITALOPRAM OXALATE 10 MG/1
20 TABLET ORAL DAILY
COMMUNITY

## 2017-08-21 RX ORDER — DICYCLOMINE HYDROCHLORIDE 10 MG/ML
20 INJECTION INTRAMUSCULAR ONCE
Status: COMPLETED | OUTPATIENT
Start: 2017-08-21 | End: 2017-08-21

## 2017-08-21 RX ADMIN — IOPAMIDOL 85 ML: 612 INJECTION, SOLUTION INTRAVENOUS at 03:53

## 2017-08-21 RX ADMIN — KETOROLAC TROMETHAMINE 7.5 MG: 15 INJECTION, SOLUTION INTRAMUSCULAR; INTRAVENOUS at 03:01

## 2017-08-21 RX ADMIN — SODIUM CHLORIDE 1000 ML: 9 INJECTION, SOLUTION INTRAVENOUS at 07:43

## 2017-08-21 RX ADMIN — ONDANSETRON 8 MG: 2 INJECTION INTRAMUSCULAR; INTRAVENOUS at 03:06

## 2017-08-21 RX ADMIN — PANTOPRAZOLE SODIUM 80 MG: 40 INJECTION, POWDER, FOR SOLUTION INTRAVENOUS at 07:48

## 2017-08-21 RX ADMIN — TAZOBACTAM SODIUM AND PIPERACILLIN SODIUM 4.5 G: 500; 4 INJECTION, SOLUTION INTRAVENOUS at 07:24

## 2017-08-21 RX ADMIN — SODIUM CHLORIDE 1000 ML: 9 INJECTION, SOLUTION INTRAVENOUS at 02:59

## 2017-08-21 RX ADMIN — FAMOTIDINE 20 MG: 10 INJECTION, SOLUTION INTRAVENOUS at 02:59

## 2017-08-21 RX ADMIN — DICYCLOMINE HYDROCHLORIDE 20 MG: 20 INJECTION, SOLUTION INTRAMUSCULAR at 03:01

## 2017-08-21 RX ADMIN — MORPHINE SULFATE 4 MG: 4 INJECTION, SOLUTION INTRAMUSCULAR; INTRAVENOUS at 05:54

## 2017-08-21 NOTE — ED PROVIDER NOTES
Subjective   HPI Comments: The patient presents with steadily worsening upper abdominal pain which she states is been going on all day.  The patient reports significant worsening starting around 11:00 tonight.  Patient does have a history of gastric bypass and reflux.  She states it feels somewhat like the reflux but worse.  The patient denies ever having anything like this before.  She still has her gallbladder and appendix.    Patient is a 62 y.o. female presenting with abdominal pain.   History provided by:  Patient and EMS personnel  Abdominal Pain   Pain location:  Epigastric, RUQ and LUQ  Pain quality: burning, sharp and stabbing    Pain radiates to:  Does not radiate  Pain severity:  Severe  Onset quality:  Gradual  Duration:  1 day  Progression:  Waxing and waning  Chronicity:  New  Context: awakening from sleep and eating    Context: not recent illness, not recent travel, not retching, not suspicious food intake and not trauma    Relieved by:  Nothing  Worsened by:  Eating  Ineffective treatments:  None tried  Associated symptoms: diarrhea and nausea    Associated symptoms: no chest pain, no chills, no dysuria, no fever, no hematemesis, no hematochezia, no hematuria, no melena, no shortness of breath, no vaginal bleeding and no vaginal discharge    Diarrhea:     Quality:  Semi-solid      Review of Systems   Constitutional: Negative.  Negative for chills and fever.   Respiratory: Negative.  Negative for shortness of breath.    Cardiovascular: Negative.  Negative for chest pain.   Gastrointestinal: Positive for abdominal pain, diarrhea and nausea. Negative for hematemesis, hematochezia and melena.   Genitourinary: Negative for dysuria, hematuria, vaginal bleeding and vaginal discharge.   Musculoskeletal: Negative.    Allergic/Immunologic: Negative for immunocompromised state.   Neurological: Negative.    Hematological: Does not bruise/bleed easily.   Psychiatric/Behavioral: Negative.    All other systems  reviewed and are negative.      Past Medical History:   Diagnosis Date   • GERD (gastroesophageal reflux disease)    • Low back pain        Allergies   Allergen Reactions   • Codeine Sulfate Nausea And Vomiting   • Lidocaine Swelling     Dental procedures       Past Surgical History:   Procedure Laterality Date   • GASTRIC BYPASS     • TONSILLECTOMY     • TUBAL ABDOMINAL LIGATION         Family History   Problem Relation Age of Onset   • Cancer Mother      lung   • Heart disease Mother    • Heart disease Father    • Coronary artery disease Father    • Heart failure Father    • Coronary artery disease Sister    • Heart disease Maternal Grandmother    • Tuberculosis Maternal Grandmother    • No Known Problems Maternal Grandfather    • Heart attack Paternal Grandmother        Social History     Social History   • Marital status:      Spouse name: N/A   • Number of children: N/A   • Years of education: N/A     Social History Main Topics   • Smoking status: Never Smoker   • Smokeless tobacco: Never Used   • Alcohol use No   • Drug use: No   • Sexual activity: Defer     Other Topics Concern   • None     Social History Narrative    Caffeine:2 servings per day    Patient lives at home with her            Objective   Physical Exam   Constitutional: She is oriented to person, place, and time. She appears well-developed and well-nourished. No distress.   Patient appears uncomfortable.   HENT:   Head: Normocephalic and atraumatic.   Eyes: EOM are normal. Pupils are equal, round, and reactive to light.   Cardiovascular: Normal rate, regular rhythm, normal heart sounds and intact distal pulses.    Pulmonary/Chest: Effort normal and breath sounds normal. No respiratory distress.   Abdominal: Soft. She exhibits no distension and no mass. There is tenderness (Moderate upper abdominal tenderness to palpation, especially in the right upper quadrant and epigastric region.  Slightly lesser in the left upper quadrant.   Guarding to deep palpation.  Lower abdomen is benign.). There is guarding. There is no rebound.   Musculoskeletal: Normal range of motion.   Neurological: She is alert and oriented to person, place, and time.   Skin: Skin is warm and dry.   Psychiatric: She has a normal mood and affect. Her behavior is normal.   Nursing note and vitals reviewed.      Critical Care  Performed by: ELISEO MEJIA  Authorized by: ELISEO MEJIA   Total critical care time: 60 minutes  Critical care time was exclusive of separately billable procedures and treating other patients.  Critical care was necessary to treat or prevent imminent or life-threatening deterioration of the following conditions: perforated peptic ulcer.  Critical care was time spent personally by me on the following activities: discussions with consultants, development of treatment plan with patient or surrogate, evaluation of patient's response to treatment, ordering and performing treatments and interventions, examination of patient, ordering and review of laboratory studies, re-evaluation of patient's condition, obtaining history from patient or surrogate and ordering and review of radiographic studies.               ED Course  ED Course   Value Comment By Time   WBC, UA: (!) 3-5 (Reviewed) Eliseo Mejia MD 08/21 0441   Bacteria, UA: (!) 1+ (Reviewed) Eliseo eMjia MD 08/21 0441   Nitrite, UA: (!) Positive (Reviewed) Eliseo Mejia MD 08/21 0441    We have checked several times with [facility] through our radiology department and were told the read was in process. My first call was at about 5:45am. Eliseo Mejia MD 08/21 9512    This imaging study was dictated by the radiologist with no printed report performed. Eliseo Mejia MD 08/21 3804    Patient has findings consistent with anastomotic leak or perforated ulcer.  I talked with the patient about this and she states that her surgeon is Dr. Olvera through [facility].  I talked with her about the options of contacting him with possible transfer versus remaining here at [facility] neurosurgeons.  After consideration the patient would like for us to contact her surgeon.  He was paged at 7:23 AM. Eliseo Mejia MD 08/21 0752    I reviewed the case with Dr. Olvera who will make arrangements for the patient and advise. Eliseo Mejia MD 08/21 0712    Just notified by [facility] of the findings on the CT scan. Eliseo Mejia MD 08/21 0725    With the surgeon who accepts the patient to [facility] for surgery.  We are making arrangements for urgent transfer over to [facility] for surgical intervention by Dr. Lozada. Eliseo Mejia MD 08/21 0777                  MDM  Number of Diagnoses or Management Options  Diagnosis management comments: Recent Results (from the past 24 hour(s))  -Light Blue Top  Collection Time: 08/21/17  2:38 AM       Result                                            Value                         Ref Range                       Extra Tube                                        hold for add-on                                          -Gold Top - SST  Collection Time: 08/21/17  2:38 AM       Result                                            Value                         Ref Range                       Extra Tube                                        Hold for add-ons.                                        -Green Top (Gel)  Collection Time: 08/21/17  2:39 AM       Result                                            Value                         Ref Range                       Extra Tube                                        Hold for add-ons.                                        -Lavender Top  Collection Time: 08/21/17  2:39 AM       Result                                            Value                         Ref Range                       Extra Tube                                        hold for add-on                                           -Comprehensive Metabolic Panel  Collection Time: 08/21/17  2:39 AM       Result                                            Value                         Ref Range                       Glucose                                           108 (H)                       70 - 100 mg/dL                  BUN                                               13                            9 - 23 mg/dL                    Creatinine                                        0.60                          0.60 - 1.30 mg/dL               Sodium                                            133                           132 - 146 mmol/L                Potassium                                         3.5                           3.5 - 5.5 mmol/L                Chloride                                          99                            99 - 109 mmol/L                 CO2                                               23.0                          20.0 - 31.0 mmol/L              Calcium                                           9.2                           8.7 - 10.4 mg/dL                Total Protein                                     6.7                           5.7 - 8.2 g/dL                  Albumin                                           4.10                          3.20 - 4.80 g/dL                ALT (SGPT)                                        20                            7 - 40 U/L                      AST (SGOT)                                        25                            0 - 33 U/L                      Alkaline Phosphatase                              84                            25 - 100 U/L                    Total Bilirubin                                   0.4                           0.3 - 1.2 mg/dL                 eGFR Non  Amer                             102                           >60 mL/min/1.73                 Globulin                                          2.6                            gm/dL                           A/G Ratio                                         1.6                           1.5 - 2.5 g/dL                  BUN/Creatinine Ratio                              21.7                          7.0 - 25.0                      Anion Gap                                         11.0                          3.0 - 11.0 mmol/L          -Lipase  Collection Time: 08/21/17  2:39 AM       Result                                            Value                         Ref Range                       Lipase                                            41                            6 - 51 U/L                 -CBC Auto Differential  Collection Time: 08/21/17  2:39 AM       Result                                            Value                         Ref Range                       WBC                                               11.39 (H)                     3.50 - 10.80 10*3/mm3           RBC                                               3.75 (L)                      3.89 - 5.14 10*6/mm3            Hemoglobin                                        11.0 (L)                      11.5 - 15.5 g/dL                Hematocrit                                        33.4 (L)                      34.5 - 44.0 %                   MCV                                               89.1                          80.0 - 99.0 fL                  MCH                                               29.3                          27.0 - 31.0 pg                  MCHC                                              32.9                          32.0 - 36.0 g/dL                RDW                                               13.9                          11.3 - 14.5 %                   RDW-SD                                            45.5                          37.0 - 54.0 fl                  MPV                                               10.3                          6.0 - 12.0 fL                    Platelets                                         269                           150 - 450 10*3/mm3              Neutrophil %                                      86.7 (H)                      41.0 - 71.0 %                   Lymphocyte %                                      9.0 (L)                       24.0 - 44.0 %                   Monocyte %                                        3.5                           0.0 - 12.0 %                    Eosinophil %                                      0.3                           0.0 - 3.0 %                     Basophil %                                        0.2                           0.0 - 1.0 %                     Immature Grans %                                  0.3                           0.0 - 0.6 %                     Neutrophils, Absolute                             9.89 (H)                      1.50 - 8.30 10*3/mm3            Lymphocytes, Absolute                             1.02                          0.60 - 4.80 10*3/mm3            Monocytes, Absolute                               0.40                          0.00 - 1.00 10*3/mm3            Eosinophils, Absolute                             0.03                          0.00 - 0.30 10*3/mm3            Basophils, Absolute                               0.02                          0.00 - 0.20 10*3/mm3            Immature Grans, Absolute                          0.03                          0.00 - 0.03 10*3/mm3       -Lactic Acid, Plasma  Collection Time: 08/21/17  2:56 AM       Result                                            Value                         Ref Range                       Lactate                                           0.5                           0.5 - 2.0 mmol/L           -Urinalysis With / Culture If Indicated  Collection Time: 08/21/17  2:57 AM       Result                                            Value                         Ref Range                       Color, UA                                          Yellow                        Yellow, Straw                   Appearance, UA                                    Cloudy (A)                    Clear                           pH, UA                                            5.5                           5.0 - 8.0                       Specific [place], UA                              1.019                         1.001 - 1.030                   Glucose, UA                                       Negative                      Negative                        Ketones, UA                                       80 mg/dL (3+) (A)             Negative                        Bilirubin, UA                                     Negative                      Negative                        Blood, UA                                         Trace (A)                     Negative                        Protein, UA                                       Negative                      Negative                        Leuk Esterase, UA                                 Negative                      Negative                        Nitrite, UA                                       Positive (A)                  Negative                        Urobilinogen, UA                                  1.0 E.U./dL                   0.2 - 1.0 E.U./dL          -Urinalysis, Microscopic Only  Collection Time: 08/21/17  2:57 AM       Result                                            Value                         Ref Range                       RBC, UA                                           0-2                           None Seen, 0-2 /HPF             WBC, UA                                           3-5 (A)                       None Seen /HPF                  Bacteria, UA                                      1+ (A)                        None Seen, Trace /HPF           Squamous Epithelial Cells, UA                     0-2                           None Seen, 0-2 /HPF              Hyaline Casts, UA                                 0-6                           0 - 6 /LPF                      Methodology                                       Automated Microscopy                                     Note: In addition to lab results from this visit, the labs listed above may include labs taken at another facility or during a different encounter within the last 24 hours. Please correlate lab times with ED admission and discharge times for further clarification of the services performed during this visit.    CT Abdomen Pelvis With Contrast    (Results Pending)  -----------------------------------------------------            08/21/17 08/21/17 08/21/17 08/21/17               0551      0600      0701      0730     -----------------------------------------------------   BP:                 93/66     102/70    102/66     Pulse:      72        61        70        71       Resp:                           16                 Temp:                                              TempSrc:                                           SpO2:      100%      98%       95%       99%       Weight:                                            Height:                                           -----------------------------------------------------  Medications  sodium chloride 0.9 % flush 10 mL (not administered)  pantoprazole (PROTONIX) injection 80 mg (not administered)  sodium chloride 0.9 % bolus 1,000 mL (not administered)  sodium chloride 0.9 % infusion 1,000 mL (0 mL Intravenous Stopped 8/21/17 0438)  dicyclomine (BENTYL) injection 20 mg (20 mg Intramuscular Given 8/21/17 0301)  famotidine (PEPCID) injection 20 mg (20 mg Intravenous Given 8/21/17 0259)  ketorolac (TORADOL) injection 7.5 mg (7.5 mg Intravenous Given 8/21/17 0301)   ondansetron (ZOFRAN) 8 mg in sodium chloride 0.9 % 50 mL IVPB (8 mg Intravenous Given 8/21/17 0306)  iopamidol (ISOVUE-300)  61 % injection 100 mL (85 mL Intravenous Given 8/21/17 0353)  Morphine sulfate (PF) injection 4 mg (4 mg Intravenous Given 8/21/17 0566)  piperacillin-tazobactam (ZOSYN) 4.5 g in iso-osmotic dextrose 100 mL IVPB (premix) (4.5 g Intravenous New Bag 8/21/17 7795)  ECG/EMG Results (last 24 hours)     Procedure Component Value Units Date/Time    ECG 12 Lead ([id number]) Collected:  08/21/17 0230     Updated:  08/21/17 0507    Narrative:       Test Reason : ABD PAIN  Blood Pressure : **/** mmHG  Vent. Rate : 075 BPM     Atrial Rate : 075 BPM     P-R Int : 174 ms          QRS Dur : 084 ms      QT Int : 412 ms       P-R-T Axes : 076 074 099 degrees     QTc Int : 460 ms    Normal sinus rhythm  When compared with ECG of 05-JUN-2017 14:20,  No significant change was found  Confirmed by ELISEO MEJIA MD ([id number]) on 8/21/2017 5:07:02 AM    Referred By:  MAKENNA           Confirmed By:ELISEO MEJIA MD           Amount and/or Complexity of Data Reviewed  Clinical lab tests: reviewed  Tests in the radiology section of CPT®: reviewed  Tests in the medicine section of CPT®: reviewed  Discussion of test results with the performing providers: yes  Discuss the patient with other providers: yes  Independent visualization of images, tracings, or specimens: yes        Final diagnoses:   Perforated gastric ulcer, acute   Epigastric abdominal pain            Eliseo Mejia MD  08/21/17 0719

## 2017-08-23 LAB — BACTERIA SPEC AEROBE CULT: ABNORMAL

## 2017-08-24 ENCOUNTER — TELEPHONE (OUTPATIENT)
Dept: EMERGENCY DEPT | Facility: HOSPITAL | Age: 62
End: 2017-08-24

## 2022-01-03 PROCEDURE — U0004 COV-19 TEST NON-CDC HGH THRU: HCPCS | Performed by: FAMILY MEDICINE
